# Patient Record
Sex: FEMALE | Race: WHITE | NOT HISPANIC OR LATINO
[De-identification: names, ages, dates, MRNs, and addresses within clinical notes are randomized per-mention and may not be internally consistent; named-entity substitution may affect disease eponyms.]

---
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
## 2024-06-06 ENCOUNTER — APPOINTMENT (OUTPATIENT)
Dept: SURGERY | Facility: CLINIC | Age: 19
End: 2024-06-06

## 2024-06-11 ENCOUNTER — APPOINTMENT (OUTPATIENT)
Dept: SURGERY | Facility: CLINIC | Age: 19
End: 2024-06-11
Payer: SUBSIDIZED

## 2024-06-11 DIAGNOSIS — K40.90 UNILATERAL INGUINAL HERNIA, W/OUT OBSTRUCTION OR GANGRENE, NOT SPECIFIED AS RECURRENT: ICD-10-CM

## 2024-06-11 PROCEDURE — 99214 OFFICE O/P EST MOD 30 MIN: CPT | Mod: 57

## 2024-06-11 NOTE — HISTORY OF PRESENT ILLNESS
[de-identified] : Right groin lump and swelling [de-identified] : ' my hernia popped out' Pt denies any pain in the area. No nausea or vomiting or changes in her bowel habits.

## 2024-06-11 NOTE — PHYSICAL EXAM
[Normal Breath Sounds] : Normal breath sounds [Normal Heart Sounds] : normal heart sounds [Normal Rate and Rhythm] : normal rate and rhythm [Calm] : calm [de-identified] : well developed white female in no acute distress [de-identified] : normal bowel sounds, without distension or tenderness. [de-identified] : Palpable RIH, soft and easily reducible, left side normal  [de-identified] : without calf pain

## 2024-06-11 NOTE — ASSESSMENT
[FreeTextEntry1] : I have discussed with pt and mother the signs and symptoms of incarceration and strangulation and the importance of calling immediately should they occur.  I have also discussed the risks, benefits and options. Pt would like to schedule a robotic assisted right inguinal hernia repair during her winter school break.